# Patient Record
Sex: FEMALE | Race: OTHER | HISPANIC OR LATINO
[De-identification: names, ages, dates, MRNs, and addresses within clinical notes are randomized per-mention and may not be internally consistent; named-entity substitution may affect disease eponyms.]

---

## 2021-08-11 PROBLEM — Z00.00 ENCOUNTER FOR PREVENTIVE HEALTH EXAMINATION: Status: ACTIVE | Noted: 2021-08-11

## 2021-08-16 ENCOUNTER — NON-APPOINTMENT (OUTPATIENT)
Age: 41
End: 2021-08-16

## 2021-08-16 DIAGNOSIS — Z78.9 OTHER SPECIFIED HEALTH STATUS: ICD-10-CM

## 2021-08-20 ENCOUNTER — NON-APPOINTMENT (OUTPATIENT)
Age: 41
End: 2021-08-20

## 2021-08-20 ENCOUNTER — APPOINTMENT (OUTPATIENT)
Dept: GASTROENTEROLOGY | Facility: CLINIC | Age: 41
End: 2021-08-20
Payer: COMMERCIAL

## 2021-08-20 VITALS
BODY MASS INDEX: 20.98 KG/M2 | SYSTOLIC BLOOD PRESSURE: 100 MMHG | HEART RATE: 64 BPM | DIASTOLIC BLOOD PRESSURE: 60 MMHG | HEIGHT: 62 IN | WEIGHT: 114 LBS | OXYGEN SATURATION: 100 %

## 2021-08-20 DIAGNOSIS — Z87.09 PERSONAL HISTORY OF OTHER DISEASES OF THE RESPIRATORY SYSTEM: ICD-10-CM

## 2021-08-20 PROCEDURE — 99205 OFFICE O/P NEW HI 60 MIN: CPT

## 2021-08-20 RX ORDER — PANTOPRAZOLE 40 MG/1
40 TABLET, DELAYED RELEASE ORAL DAILY
Qty: 90 | Refills: 3 | Status: ACTIVE | COMMUNITY
Start: 2021-08-20 | End: 1900-01-01

## 2021-08-20 RX ORDER — CETIRIZINE HCL 10 MG
TABLET ORAL
Refills: 0 | Status: ACTIVE | COMMUNITY

## 2021-08-20 NOTE — ASSESSMENT
[FreeTextEntry1] : \par \par \par 1. GERD:  active w  ht burn,  dysphagia,  globus\par                 r/o complicated GERD w intermittent dysphagia:  erosive dis, stricture/ ring, EE, Barretts\par * H/O  LPR,  No prior h/o Lara’s  or  Esophagitis grade: A--  was found \par \par Recommend: \par * Anti-reflux diet & life-style changes reviewed & re-emphasized.  ++  Bedge required\par * Weight reduction & regular exercise emphasized\par \par *  restart  PPI: Pantop 40mg qam  ,  ++  H2B q HS: Famotidine 40mg \par     No Carafate  1 gram:   --was emphasized\par I reviewed & summarized the prospective randomized & retrospective non-randomized studies\par looking at potential long term SE's of PPIs, w special attention to associations & actual cause\par as related to GI infections, bone loss, cognitive changes, KD, Covid, vitamin & electrolyte deficiencies\par questions were answered, pt advised that PPIs should be used when needed as indicated by their\par clinical indication and response and tapering off is always the goal if possible. pt understood.\par \par *  for  EGD:  [   9    ] mo.  /  [   2021    ] yr \par * No  need for pH Monitor,  Manometry,   \par    may need Esophagram \par *  ++  need for ENT  eval/F/U, or  Surgical  eval  --  was emphasized \par \par \par \par \par \par \par 2. Gastritis :   No N, V, early satiety, ? pain\par *  h/o  H.Pylori,  No  IM,    Bile;    No NSAID  or  ETOH exposure-- was found\par Recommended and reviewed: \par * Avoid NSAIDs / ETOH--have been shown to exacerbate and possible lead to cx's & GIBs\par * No Prevpac  or Pylera, is needed,  s/p ABX regimen in the past, w F/U documenting eradication \par * ++  PPI: pantop 40 mg qd   x 9 0 days, or  Carafate 1 gm QID is needed\par *  for F/U EGD -- for GERD \par \par \par \par \par \par \par 3. Hemorrhoids:  well - controlled.  No pain,  swelling,  itch,  bleeding\par * Discussed the potential complications of thrombosis,  pain,  infection,  swelling, itching,  bleeding \par Recommendations: \par * High - Fiber Diet was reviewed and emphasized\par * 6  --  8 cups of decaffeinated fluid daily was emphasized \par * Sitz Bathes BID,  No:  Anusol HC  Suppos / Cream  NE BID -- was needed\par * No:  Tucks BID,  Balneol Lotion,   Calmoseptine Oint -- was needed ;    can use  Prep H prn\par * No:  need for  Colorectal surgical evaluation for possible ablation w Dr --  was emphasized\par \par \par \par \par \par 4. Colorectal  / Gastric Neoplasia  Screening:  to be evaluated\par   Utilizing teaching posters and anatomical models the following were discussed and emphasized with the patient in detail: \par * Discussed the pre-malignant potential of polyps\par * Discussed the importance of f/u surveillance / screening colonoscopy / EGD \par * High-Fiber Diet was reviewed and emphasized\par * Anti-oxidants and ASA/NSAID Therapy reviewed\par * Given age <  45-51 yo--> colonoscopy in 2 yrs , EGD in 9/2021\par * Recommend Colonoscopy R/O  Colonic  Neoplasia, \par \par \par \par \par \par Informed Consent:\par * The risks & Benefits of EGD  were discussed w patient.\par * This included but was not limited to perforation, bleeding, sedation /med rxns possibly requiring surgery, blood transfusions, antibiotics & CPR/Intubation.\par * Pt. understands & agrees to the procedures.\par The following instructions in regards to the prep and medically essential ( cardiac, pulmonary, sz, psych, endocrine)  pre-op medication administration\par was reviewed and emphasized with the patient . \par * Pt. advised to D/C  ASA/NSAIDs  7   Days  PTP.\par * [ +++ ]  Dulcolax / Miralax / Mag. Citrate ,  [     ] Prepopik/ Clenpiq ,  [     ] Osmo Prep,  [    ] GoLytely,  prep. reviewed w Pt.\par * Hold  [           ] AM of procedure.\par * Hold  [           ] PM of procedure.\par * Take  [           ] PM of procedure.\par * Take  [           ] AM of procedure.\par \par \par \par

## 2021-08-20 NOTE — HISTORY OF PRESENT ILLNESS
[de-identified] : \par \par \par \par This HPI reflects a summary and review of records : including previous and most recent  Labs, body imaging, consults and progress notes, operative and pathology reports, EKG reports, ED records, found in [email or web address], [employer],  [facility] and any additional records brought in by  the patient at the time of the visit.\par \par \par PCP: Carniciu\par \par 40 yo F w h/o Allergeis\par       Gastritis, rx w ABX x 14 days many years, had EGD after which documented eradication\par       Hemorrhoids\par In April 2021--> began w throat tickling/ globus\par In May saw ENt in [place]--> told of GERD w LPR--> Rx w Pantop 40mg qd x 3 weeks w good response\par In June 2021: symptoms returned w globus, throat burn, + ht Burn , chest pressure\par                      +Belching,regurg--> mouth , +  intermittent dysphagia\par took Gaviscon and prilosec prn w some relief\par Also c/o epigastric pain, no NSAIDS, Melena, wt loss, anorezia, early satiety\par \par \par \par * Abd pain-some \par * Nausea-no\par * Vomit-no\par * Early satiety--no\par * Belching-yes \par * Hiccups--no\par * Regurgitation-yes \par * Acid Taste / Water Brash-yes\par * Ht burn-yes\par * Throat Clearing-no\par * Post-Nasal Drip-no\par * Congestion-no\par * Globus-yes\par * Cough-no\par * Wheeze / PC--no\par * Hoarseness-no\par * Dysphagia-intermittent \par * BMs: # 4 qd\par * Constipation-no\par * Diarrhea-no\par * Bloating-no\par * Strain on Defecation--no\par * Incompl Evac--no\par * Flatulence-no\par * Gurgling-no\par * Melena-no\par * BPBPR-no\par * Anorexia-no\par * Wt. Loss-no\par \par

## 2021-09-10 ENCOUNTER — RESULT REVIEW (OUTPATIENT)
Age: 41
End: 2021-09-10

## 2021-09-12 ENCOUNTER — RESULT REVIEW (OUTPATIENT)
Age: 41
End: 2021-09-12

## 2021-09-13 ENCOUNTER — APPOINTMENT (OUTPATIENT)
Dept: GASTROENTEROLOGY | Facility: HOSPITAL | Age: 41
End: 2021-09-13

## 2021-09-13 ENCOUNTER — RESULT REVIEW (OUTPATIENT)
Age: 41
End: 2021-09-13

## 2021-11-05 ENCOUNTER — NON-APPOINTMENT (OUTPATIENT)
Age: 41
End: 2021-11-05

## 2021-11-08 ENCOUNTER — APPOINTMENT (OUTPATIENT)
Dept: GASTROENTEROLOGY | Facility: CLINIC | Age: 41
End: 2021-11-08
Payer: COMMERCIAL

## 2021-11-08 VITALS
HEIGHT: 62 IN | SYSTOLIC BLOOD PRESSURE: 102 MMHG | HEART RATE: 66 BPM | OXYGEN SATURATION: 100 % | BODY MASS INDEX: 20.98 KG/M2 | WEIGHT: 114 LBS | DIASTOLIC BLOOD PRESSURE: 62 MMHG

## 2021-11-08 PROCEDURE — 99214 OFFICE O/P EST MOD 30 MIN: CPT

## 2021-11-08 NOTE — HISTORY OF PRESENT ILLNESS
[de-identified] : \par This HPI reflects a summary and review of records : including previous and most recent  Labs, body imaging, consults and progress notes, operative and pathology reports, EKG reports, ED records, found in [facility], [facility],  [facility] and any additional records brought in by  the patient at the time of the visit.\par \par \par PCP: Ava\par \par 40 yo F w h/o Allergeis\par       Gastritis, rx w ABX x 14 days many years ago , had EGD after which documented eradication\par       Hemorrhoids\par 8/20/21 Init CC: \par In April 2021--> began w throat tickling/ globus\par In May 2021b saw ENT in [place]--> told of GERD w LPR--> Rx w Pantop 40mg qd x 3 weeks w good response\par In June 2021: symptoms returned w globus, throat burn, + ht Burn , chest pressure\par                      +Belching,regurg--> mouth , +  intermittent dysphagia\par took Gaviscon and prilosec prn w some relief\par Also c/o epigastric pain, no NSAIDS, Melena, wt loss, anorezia, early satiety\par 9/13/21 EGD:  gastritis, mild,  no HP; 1+ MUCOUS;  2cm HH, GEJ-  sl agar; Inc Schatzki Ring, Esophagitis A--.No Barretts, no EE\par                           VC: TR bladimir/TR muocus\par \par 11/8/21: Feeling well, no c/o , CP, SOB/ THACKER, Cough, Wheeze, Palpitations, edema\par   \par reviewed endoscopic findings and pathology in detail with patient:  EGD:  913/21\par all of the patients questions were addressed and answered\par \par \par 11/8/21: On Pantop 40 q am and Famotidine 40mg qhs\par                 feeling better, interms of GI issues\par Chest pressure--> no \par * Abd pain-> no\par  Ht burn--> no\par Throat burn--> no\par Globus--> no\par Belch-->no\par Regurg--> no\par * Acid Taste / Water Brash->no\par Dysphagia--> no\par * Nausea-no\par * Vomit-no\par * Early satiety--no\par * Hiccups--no\par  * Throat Clearing-no\par * Post-Nasal Drip-no\par * Congestion-no\par * Cough-no\par * Wheeze / PC--no\par * Hoarseness-no\par  * BMs: # 4 qd\par * Constipation-no\par * Diarrhea-no\par * Bloating-no\par * Strain on Defecation--no\par * Incompl Evac--no\par * Flatulence-no\par * Gurgling-no\par * Melena-no\par * BPBPR-no\par * Anorexia-no\par * Wt. Loss-no\par \par

## 2021-11-08 NOTE — ASSESSMENT
[FreeTextEntry1] : \par \par                          \par \par 1. GERD:  better, w mcu less   ht burn,  dysphagia,  globus\par                9/13/21 EGD:  gastritis, mild,  no HP; 1+ MUCOUS;  2cm HH, GEJ-  sl agar; Inc Schatzki Ring,\par                                        VC: TR bladimir/TR muocus\par    + LPR,  No  Lara’s but +   Esophagitis grade: A-- , w no EE  was found \par \par Recommend: \par * Anti-reflux diet & life-style changes reviewed & re-emphasized.  ++  Bedge required\par * Weight reduction & regular exercise emphasized\par \par *  restart  PPI: Pantop 40mg qam  ,  ++  H2B q HS: Famotidine 40mg \par     No Carafate  1 gram\par I reviewed & summarized the prospective randomized & retrospective non-randomized studies\par looking at potential long term SE's of PPIs, w special attention to associations & actual cause\par as related to GI infections, bone loss, cognitive changes, KD, Covid, vitamin & electrolyte deficiencies\par questions were answered, pt advised that PPIs should be used when needed as indicated by their\par clinical indication and response and tapering off is always the goal if possible. pt understood.\par \par *  for  EGD: 9/2025--for Barretts screening \par * No  need for pH Monitor,  Manometry,   \par    may need Esophagram \par *  ++  need for ENT  eval/F/U, but no Surgical  eval  --  was emphasized \par \par \par \par \par \par \par 2. Gastritis :   No N, V, early satiety, pain \par *  h/o  H.Pylori, but none seen presently, No  IM,  Bile; No NSAID  or  ETOH exposure-- was found\par Recommended and reviewed: \par * Avoid NSAIDs / ETOH--have been shown to exacerbate and possible lead to cx's & GIBs\par * No Prevpac  or Pylera, is needed,  s/p ABX regimen in the past, w F/U documenting eradication \par * ++  PPI: pantop 40 mg qd   x 90 days, No Carafate 1 gm QID is needed\par * np F/U EGD -- needed \par \par \par \par \par \par \par 3. Hemorrhoids:  well - controlled.  No pain,  swelling,  itch,  bleeding\par * Discussed  previously\par   the potential complications of thrombosis,  pain,  infection,  swelling, itching,  bleeding \par Recommendations: \par * High - Fiber Diet was  emphasized\par * 6  --  8 cups of decaffeinated fluid daily was emphasized \par * Sitz Bathes BID prn,    No:  Anusol HC  Suppos / Cream  HI BID -- was needed\par * No:  Tucks BID, Balneol Lotion, Calmoseptine Oint -- was needed ;    can use  Prep H prn\par * No:  need for  Colorectal surgical evaluation for possible ablation \par \par \par \par \par \par 4. Colorectal  Neoplasia  Screening:  \par   Utilizing teaching posters and anatomical models the following were discussed and emphasized with the patient in detail: \par * Discussed the pre-malignant potential of polyps\par * Discussed the importance of f/u surveillance / screening colonoscopy / EGD \par * High-Fiber Diet was reviewed and emphasized\par * Anti-oxidants and ASA/NSAID Therapy reviewed\par * Given age <  45-49 yo--> in 2 yrs  would Recommend Colonoscopy to R/O  Colonic  Neoplasia, \par \par \par \par \par \par \par \par \par \par

## 2021-11-08 NOTE — REVIEW OF SYSTEMS
[Red Eyes] : eyes not red [Skin Lesions] : no skin lesions [Proptosis] : no proptosis [Easy Bruising] : no tendency for easy bruising

## 2022-01-11 NOTE — PHYSICAL EXAM
[General Appearance - Alert] : alert [General Appearance - In No Acute Distress] : in no acute distress [Sclera] : the sclera and conjunctiva were normal [Neck Appearance] : the appearance of the neck was normal [] : no respiratory distress [Auscultation Breath Sounds / Voice Sounds] : lungs were clear to auscultation bilaterally [Heart Rate And Rhythm] : heart rate was normal and rhythm regular [Heart Sounds] : normal S1 and S2 [Heart Sounds Gallop] : no gallops [Murmurs] : no murmurs [Heart Sounds Pericardial Friction Rub] : no pericardial rub [Edema] : there was no peripheral edema [Abnormal Walk] : normal gait [Skin Color & Pigmentation] : normal skin color and pigmentation [Oriented To Time, Place, And Person] : oriented to person, place, and time [Flat] : flat [Normal] : normal [Soft, Nontender] : the abdomen was soft and nontender [No Mass] : no masses were palpated [No HSM] : no hepatosplenomegaly noted

## 2022-01-18 ENCOUNTER — APPOINTMENT (OUTPATIENT)
Dept: GASTROENTEROLOGY | Facility: CLINIC | Age: 42
End: 2022-01-18
Payer: COMMERCIAL

## 2022-01-18 VITALS
DIASTOLIC BLOOD PRESSURE: 64 MMHG | HEIGHT: 62 IN | HEART RATE: 76 BPM | OXYGEN SATURATION: 100 % | WEIGHT: 116 LBS | RESPIRATION RATE: 16 BRPM | BODY MASS INDEX: 21.35 KG/M2 | TEMPERATURE: 97.4 F | SYSTOLIC BLOOD PRESSURE: 92 MMHG

## 2022-01-18 PROCEDURE — 99214 OFFICE O/P EST MOD 30 MIN: CPT

## 2022-01-18 NOTE — REVIEW OF SYSTEMS
[As Noted in HPI] : as noted in HPI [Negative] : Respiratory [Red Eyes] : eyes not red [Skin Lesions] : no skin lesions [Proptosis] : no proptosis [Easy Bruising] : no tendency for easy bruising

## 2022-01-18 NOTE — ASSESSMENT
[FreeTextEntry1] : \par \par                          \par \par 1. GERD:  mild flare since December 2/2 noncompliance w diet\par                 now w intermittnet CP /  ht burn,  No sphagia,  globus\par                 ? secondary esophageal spasm/ SS esophagus\par                9/13/21 EGD:  gastritis, mild,  no HP; 1+ MUCOUS;  2cm HH, GEJ-  sl agar; Inc Schatzki Ring,\par                                        VC: TR bladimir/TR muocus\par    + LPR,  No  Lara’s but +   Esophagitis grade: A-- , w no EE  was found \par \par Recommend: \par * Anti-reflux diet & life-style changes reviewed & re-emphasized.  ++  Bedge required\par * Weight reduction & regular exercise emphasized\par \par *  restart  PPI: Pantop 40mg qam  ,  ++  H2B q HS: Famotidine 40mg \par    If episodes persist can split Pant 40--> 20 mg bid 1/2hr ac\par    other wise Gaviscon 30cc prm\par    Also can start a trial  Levsin SL prn\par     No Carafate  1 gram\par I reviewed & summarized the prospective randomized & retrospective non-randomized studies\par looking at potential long term SE's of PPIs, w special attention to associations & actual cause\par as related to GI infections, bone loss, cognitive changes, KD, Covid, vitamin & electrolyte deficiencies\par questions were answered, pt advised that PPIs should be used when needed as indicated by their\par clinical indication and response and tapering off is always the goal if possible. pt understood.\par \par *  for  EGD: 9/2025--for Barretts screening \par * No  need for pH Monitor,  Manometry,   \par    may need Esophagram \par *  ++  need for ENT  eval/F/U, but no Surgical  eval  --  was emphasized \par \par \par \par \par \par \par 2. Gastritis :   No N, V, early satiety, pain \par *  h/o  H.Pylori, but none seen presently, No  IM,  Bile; No NSAID  or  ETOH exposure-- was found\par Recommended and reviewed: \par * Avoid NSAIDs / ETOH--have been shown to exacerbate and possible lead to cx's & GIBs\par * No Prevpac  or Pylera, is needed,  s/p ABX regimen in the past, w F/U documenting eradication \par * ++  PPI: pantop 40 mg qd   x 90 days, No Carafate 1 gm QID is needed\par * no F/U EGD -- needed \par \par \par \par \par \par \par 3. Hemorrhoids:  well - controlled.  No pain,  swelling,  itch,  bleeding\par * Discussed  previously\par   the potential complications of thrombosis,  pain,  infection,  swelling, itching,  bleeding \par Recommendations: \par * High - Fiber Diet was  emphasized\par * 6  --  8 cups of decaffeinated fluid daily was emphasized \par * Sitz Bathes BID prn,    No:  Anusol HC  Suppos / Cream  TX BID -- was needed\par * No:  Tucks BID, Balneol Lotion, Calmoseptine Oint -- was needed ;    can use  Prep H prn\par * No:  need for  Colorectal surgical evaluation for possible ablation \par \par \par \par \par \par 4. Colorectal  Neoplasia  Screening:  \par   Utilizing teaching posters and anatomical models the following were discussed and emphasized with the patient in detail: \par * Discussed the pre-malignant potential of polyps\par * Discussed the importance of f/u surveillance / screening colonoscopy / EGD \par * High-Fiber Diet was reviewed and emphasized\par * Anti-oxidants and ASA/NSAID Therapy reviewed\par * Given age <  45-49 yo--> in 2 yrs  would Recommend Colonoscopy to R/O  Colonic  Neoplasia, \par \par \par \par \par \par \par \par \par \par

## 2022-01-18 NOTE — HISTORY OF PRESENT ILLNESS
[de-identified] : \par This HPI reflects a summary and review of records : including previous and most recent  Labs, body imaging, consults and progress notes, operative and pathology reports, EKG reports, ED records, found in [email or web address], [employer],  [employer] and any additional records brought in by  the patient at the time of the visit.\par \par \par PCP: Carniciu\par \par 42 yo F w h/o Allergeis\par       Gastritis, rx w ABX x 14 days many years ago , had EGD after which documented eradication\par       Hemorrhoids\par 8/20/21 Init CC: \par In April 2021--> began w throat tickling/ globus\par In May 2021b saw ENT in [place]--> told of GERD w LPR--> Rx w Pantop 40mg qd x 3 weeks w good response\par In June 2021: symptoms returned w globus, throat burn, + ht Burn , chest pressure\par                      +Belching,regurg--> mouth , +  intermittent dysphagia\par took Gaviscon and prilosec prn w some relief\par Also c/o epigastric pain, no NSAIDS, Melena, wt loss, anorezia, early satiety\par 9/13/21 EGD:  gastritis, mild,  no HP; 1+ MUCOUS;  2cm HH, GEJ-  sl rich; Inc Schatzki Ring, Esophagitis A--.No Barretts, no EE\par                           VC: TR bladimir/TR muocus\par \par 11/8/21: No  globus, throat burn,ht Burn hest pressure\par               No  Belching,regurg--> mouth , intermittent dysphagia\par \par 1/18/22 Feeling well, no c/o , CP, SOB/ THACKER, Cough, Wheeze, Palpitations, edema\par   \par reviewed endoscopic findings and pathology in detail with patient:  EGD:  913/21\par all of the patients questions were addressed and answered\par \par \par 1/18/22  On Pantop 40 q am and Famotidine 40mg qhs\par                 In December eating spicy foods, caffeine\par                 had epigastric burn--> chest\par                 no throat burn/ globus\par                 since then episodes of CP, no sob, cough, wheeze\par                 usu 30-60min PC, takes tums w relief\par Chest pressure--> intermittnet \par * Abd pain-> no\par  Ht burn--> occas \par Throat burn--> no\par Globus--> no\par Belch-->yes\par Regurg--> no\par * Acid Taste / Water Brash->no\par Dysphagia--> no\par * Nausea-no\par * Vomit-no\par * Early satiety--no\par * Hiccups--no\par  * Throat Clearing-no\par * Post-Nasal Drip-no\par * Congestion-no\par * Cough-no\par * Wheeze / PC--no\par * Hoarseness-no\par  * BMs: # 4 qd\par * Constipation-no\par * Diarrhea-no\par * Bloating-no\par * Strain on Defecation--no\par * Incompl Evac--no\par * Flatulence-no\par * Gurgling-no\par * Melena-no\par * BPBPR-no\par * Anorexia-no\par * Wt. Loss-no\par \par

## 2022-05-10 ENCOUNTER — NON-APPOINTMENT (OUTPATIENT)
Age: 42
End: 2022-05-10

## 2022-05-24 ENCOUNTER — APPOINTMENT (OUTPATIENT)
Dept: GASTROENTEROLOGY | Facility: CLINIC | Age: 42
End: 2022-05-24
Payer: COMMERCIAL

## 2022-05-24 VITALS
WEIGHT: 115 LBS | HEART RATE: 72 BPM | DIASTOLIC BLOOD PRESSURE: 60 MMHG | SYSTOLIC BLOOD PRESSURE: 104 MMHG | BODY MASS INDEX: 21.16 KG/M2 | HEIGHT: 62 IN

## 2022-05-24 DIAGNOSIS — Z86.19 PERSONAL HISTORY OF OTHER INFECTIOUS AND PARASITIC DISEASES: ICD-10-CM

## 2022-05-24 DIAGNOSIS — R13.19 OTHER DYSPHAGIA: ICD-10-CM

## 2022-05-24 DIAGNOSIS — K21.9 GASTRO-ESOPHAGEAL REFLUX DISEASE W/OUT ESOPHAGITIS: ICD-10-CM

## 2022-05-24 DIAGNOSIS — K64.8 OTHER HEMORRHOIDS: ICD-10-CM

## 2022-05-24 DIAGNOSIS — Z12.11 ENCOUNTER FOR SCREENING FOR MALIGNANT NEOPLASM OF COLON: ICD-10-CM

## 2022-05-24 DIAGNOSIS — K29.50 UNSPECIFIED CHRONIC GASTRITIS W/OUT BLEEDING: ICD-10-CM

## 2022-05-24 PROCEDURE — 99214 OFFICE O/P EST MOD 30 MIN: CPT

## 2022-05-24 NOTE — ASSESSMENT
[FreeTextEntry1] : \par \par                          \par \par 1. GERD:  s/p mild flare  December 2/2 noncompliance w diet\par                 Now w no  intermittnet CP,  ht burn,  Dysphagia,  globus\par                 ? secondary esophageal spasm/ SS esophagus\par                9/13/21 EGD:  gastritis, mild,  no HP; 1+ MUCOUS;  2cm HH, GEJ-  sl agar; Inc Schatzki Ring,\par                                        VC: TR bladimir/TR muocus\par    + LPR,  No  Lara’s but +   Esophagitis grade: A-- , w no EE  was found \par \par Recommend: \par * Anti-reflux diet & life-style changes reviewed & re-emphasized.  ++  Bedge required\par * Weight reduction & regular exercise emphasized\par \par *  on  PPI: Pantop 40mg qam --> qo am  Alt w Famotidine 40mg qo am  ,  ++  H2B q HS: Famotidine 40mg \par    If episodes persist can split Pant 40--> 20 mg bid 1/2hr ac\par    other wise Gaviscon 30cc prm\par    Also can start a trial  Levsin SL prn\par     No Carafate  1 gram\par I reviewed & summarized the prospective randomized & retrospective non-randomized studies\par looking at potential long term SE's of PPIs, w special attention to associations & actual cause\par as related to GI infections, bone loss, cognitive changes, KD, Covid, vitamin & electrolyte deficiencies\par questions were answered, pt advised that PPIs should be used when needed as indicated by their\par clinical indication and response and tapering off is always the goal if possible. pt understood.\par \par *  for  EGD: 9/2025--for Barretts screening \par * No  need for pH Monitor,  Manometry,   \par    may need Esophagram \par *  ++  need for ENT  eval/F/U, but no Surgical  eval  --  was emphasized \par \par \par \par \par \par \par 2. Gastritis :   No N, V, early satiety, pain \par *  h/o  H.Pylori, but none seen presently, No  IM,  Bile; No NSAID  or  ETOH exposure-- was found\par Recommended and reviewed: \par * Avoid NSAIDs / ETOH--have been shown to exacerbate and possible lead to cx's & GIBs\par * No Prevpac  or Pylera, is needed,  s/p ABX regimen in the past, w F/U documenting eradication \par * ++  PPI: pantop 40 mg qo am   x 90 days, No Carafate 1 gm QID is needed\par * no F/U EGD -- needed \par \par \par \par \par \par \par 3. Hemorrhoids:  well - controlled.  No pain,  swelling,  itch,  bleeding\par * Discussed  previously\par   the potential complications of thrombosis,  pain,  infection,  swelling, itching,  bleeding \par Recommendations: \par * High - Fiber Diet was  emphasized\par * 6  --  8 cups of decaffeinated fluid daily was emphasized \par * Sitz Bathes BID prn,    No:  Anusol HC  Suppos / Cream  AL BID -- was needed\par * No:  Tucks BID, Balneol Lotion, Calmoseptine Oint -- was needed ;    can use  Prep H prn\par * No:  need for  Colorectal surgical evaluation for possible ablation \par \par \par \par \par \par 4. Colorectal  Neoplasia  Screening:  \par   Utilizing teaching posters and anatomical models the following were discussed and emphasized with the patient in detail: \par * Discussed the pre-malignant potential of polyps\par * Discussed the importance of f/u surveillance / screening colonoscopy / EGD \par * High-Fiber Diet was reviewed and emphasized\par * Anti-oxidants and ASA/NSAID Therapy reviewed\par * Given age <  45-51 yo--> in 1 yrs  would Recommend Colonoscopy to R/O  Colonic  Neoplasia, \par \par \par \par \par \par \par \par \par \par

## 2022-05-24 NOTE — HISTORY OF PRESENT ILLNESS
[de-identified] : \par This HPI reflects a summary and review of records : including previous and most recent  Labs, body imaging, consults and progress notes, operative and pathology reports, EKG reports, ED records, found in [facility], [facility],  [facility] and any additional records brought in by  the patient at the time of the visit.\par \par \par PCP: Ava\par \par 41 yo F w h/o Allergeis\par       HP Gastritis, rx w ABX x 14 days many years ago , had EGD after which documented eradication\par       Hemorrhoids\par 8/20/21 Init CC: \par In April 2021--> began w throat tickling/ globus\par In May 2021 saw ENT in [place]--> told of GERD w LPR--> Rx w Pantop 40mg qd x 3 weeks w good response\par In June 2021: symptoms returned w globus, throat burn, + ht Burn , chest pressure\par                      +Belching,regurg--> mouth , +  intermittent dysphagia\par took Gaviscon and prilosec prn w some relief\par Also c/o epigastric pain, no NSAIDS, Melena, wt loss, anorezia, early satiety\par 9/13/21 EGD:  gastritis, mild,  no HP; 1+ MUCOUS;  2cm HH, GEJ-  sl rich; Inc Schatzki Ring, Esophagitis A--.No Barretts, no EE\par                           VC: TR bladimir/TR muocus\par \par 11/8/21: No  globus, throat burn,ht Burn chest pressure\par               No  Belching,regurg--> mouth , intermittent dysphagia\par \par 1/18/22   On Pantop 40 q am and Famotidine 40mg qhs\par                 In December eating spicy foods, caffeine\par                 had epigastric burn--> chest; no throat burn/ globus\par                 since then episodes of CP, no sob, cough, wheeze\par                 usu 30-60min PC, takes tums w relief\par \par \par 5/24/22 : Feeling well, no c/o SOB/ THACKER, Cough, Wheeze, Palpitations, edema\par   \par reviewed endoscopic findings and pathology in detail with patient:  EGD:  913/21\par all of the patients questions were addressed and answered\par \par \par 5/24/22   On Pantop 40 q am and Famotidine 40mg qhs, doing well \par                 \par  epigastric burn--> chest: none \par                               \par Chest pressure--> intermittent:  no\par * Abd pain-> Epigastric --> no\par  Ht burn--> no\par Throat burn--> no\par Globus--> no\par Belch-->no\par Regurg--> no\par * Acid Taste / Water Brash->no\par Dysphagia--> no\par * Nausea-no\par * Vomit-no\par * Early satiety--no\par * Hiccups--no\par  * Throat Clearing-no\par * Post-Nasal Drip-no\par * Congestion-no\par * Cough-no\par * Wheeze / PC--no\par * Hoarseness-no\par  * BMs: # 4 qd\par * Constipation-no\par * Diarrhea-no\par * Bloating-no\par * Strain on Defecation--no\par * Incompl Evac--no\par * Flatulence-no\par * Gurgling-no\par * Melena-no\par * BPBPR-no\par * Anorexia-no\par * Wt. Loss-no\par \par

## 2022-06-24 ENCOUNTER — NON-APPOINTMENT (OUTPATIENT)
Age: 42
End: 2022-06-24

## 2022-09-14 ENCOUNTER — RX RENEWAL (OUTPATIENT)
Age: 42
End: 2022-09-14

## 2022-09-14 RX ORDER — FAMOTIDINE 40 MG/1
40 TABLET, FILM COATED ORAL TWICE DAILY
Qty: 90 | Refills: 3 | Status: ACTIVE | COMMUNITY
Start: 2021-09-13 | End: 1900-01-01

## 2023-01-02 ENCOUNTER — NON-APPOINTMENT (OUTPATIENT)
Age: 43
End: 2023-01-02

## 2023-01-02 NOTE — PHYSICAL EXAM
[General Appearance - Alert] : alert [General Appearance - In No Acute Distress] : in no acute distress [Sclera] : the sclera and conjunctiva were normal [] : no respiratory distress [Auscultation Breath Sounds / Voice Sounds] : lungs were clear to auscultation bilaterally [Heart Rate And Rhythm] : heart rate was normal and rhythm regular [Heart Sounds] : normal S1 and S2 [Murmurs] : no murmurs [Heart Sounds Gallop] : no gallops [Heart Sounds Pericardial Friction Rub] : no pericardial rub [Edema] : there was no peripheral edema [Abnormal Walk] : normal gait [Skin Color & Pigmentation] : normal skin color and pigmentation [Oriented To Time, Place, And Person] : oriented to person, place, and time [Flat] : flat [Normal] : normal [Soft, Nontender] : the abdomen was soft and nontender [No Mass] : no masses were palpated [No HSM] : no hepatosplenomegaly noted

## 2023-01-03 NOTE — HISTORY OF PRESENT ILLNESS
[de-identified] : \par This HPI reflects a summary and review of records : including previous and most recent  Labs, body imaging, consults and progress notes, operative and pathology reports, EKG reports, ED records, found in [facility], [facility],  [facility] and any additional records brought in by  the patient at the time of the visit.\par \par \par PCP: Carniciu\par \par 41 yo F w h/o Allergeis\par       HP Gastritis, rx w ABX x 14 days many years ago , had EGD after which documented eradication\par       Hemorrhoids\par 8/20/21 Init CC: \par In April 2021--> began w throat tickling/ globus\par In May 2021 saw ENT in [place]--> told of GERD w LPR--> Rx w Pantop 40mg qd x 3 weeks w good response\par In June 2021: symptoms returned w globus, throat burn, + ht Burn , chest pressure\par                      +Belching,regurg--> mouth , +  intermittent dysphagia\par took Gaviscon and prilosec prn w some relief\par Also c/o epigastric pain, no NSAIDS, Melena, wt loss, anorezia, early satiety\par 9/13/21 EGD:  gastritis, mild,  no HP; 1+ MUCOUS;  2cm HH, GEJ-  sl rich; Inc Schatzki Ring, Esophagitis A--.No Barretts, no EE\par                           VC: TR bladimir/TR muocus\par \par 11/8/21: No  globus, throat burn,ht Burn chest pressure\par               No  Belching,regurg--> mouth , intermittent dysphagia\par \par 1/18/22   On Pantop 40 q am and Famotidine 40mg qhs\par                 In December eating spicy foods, caffeine\par                 had epigastric burn--> chest; no throat burn/ globus\par                 since then episodes of CP, no sob, cough, wheeze\par                 usu 30-60min PC, takes tums w relief\par \par \par 5/24/22 : On Pantop 40 q am and Famotidine 40mg qhs, doing well \par                Rec taper: Pantop 40mg alt w Famotidine 40mg q am & Famotidine 40mg q hs\par                Levsin SL Prn \par \par 1/24/23 : Feeling well, no c/o SOB/ THACKER, Cough, Wheeze, Palpitations, edema\par   \par reviewed endoscopic findings and pathology in detail with patient:  EGD:  913/21\par all of the patients questions were addressed and answered\par \par \par 1/24/23: \par                 \par  epigastric burn--> chest: none \par                               \par Chest pressure--> intermittent:  no\par * Abd pain-> Epigastric --> no\par  Ht burn--> no\par Throat burn--> no\par Globus--> no\par Belch-->no\par Regurg--> no\par * Acid Taste / Water Brash->no\par Dysphagia--> no\par * Nausea-no\par * Vomit-no\par * Early satiety--no\par * Hiccups--no\par  * Throat Clearing-no\par * Post-Nasal Drip-no\par * Congestion-no\par * Cough-no\par * Wheeze / PC--no\par * Hoarseness-no\par  * BMs: # 4 qd\par * Constipation-no\par * Diarrhea-no\par * Bloating-no\par * Strain on Defecation--no\par * Incompl Evac--no\par * Flatulence-no\par * Gurgling-no\par * Melena-no\par * BPBPR-no\par * Anorexia-no\par * Wt. Loss-no\par \par

## 2023-01-03 NOTE — ASSESSMENT
[FreeTextEntry1] : \par \par                          \par \par 1. GERD:  s/p mild flare  December 2/2 noncompliance w diet\par                   presently w no  intermittnet CP,  ht burn,  Dysphagia,  globus\par                 ? secondary esophageal spasm/ SS esophagus\par                9/13/21 EGD:  gastritis, mild,  no HP; 1+ MUCOUS;  2cm HH, GEJ-  sl agar; Inc Schatzki Ring,\par                                        VC: TR bladimir/TR muocus\par    + LPR,  No  Lara’s but +   Esophagitis grade: A-- , w no EE  was found \par \par Recommend: \par * Anti-reflux diet & life-style changes reviewed & re-emphasized.  ++  Bedge required\par * Weight reduction & regular exercise emphasized\par \par *  on  PPI: Pantop 40mg qam -->  Alt w Famotidine 40mg qo am  ,  ++  H2B q HS: Famotidine 40mg \par    If episodes persist can split Pant 40--> 20 mg bid 1/2hr ac\par    other wise Gaviscon 30cc prm\par    Also can start a trial  Levsin SL prn\par     No Carafate  1 gram\par I  previously  reviewed & summarized the prospective randomized & retrospective non-randomized studies\par looking at potential long term SE's of PPIs, w special attention to associations & actual cause\par as related to GI infections, bone loss, cognitive changes, KD, Covid, vitamin & electrolyte deficiencies\par questions were answered, pt advised that PPIs should be used when needed as indicated by their\par clinical indication and response and tapering off is always the goal if possible. pt understood.\par \par *  for  EGD: 9/2025--for Barretts screening \par * No  need for pH Monitor,  Manometry,   \par    may need Esophagram \par *  ++  need for ENT  eval/F/U, but no Surgical  eval  --  was emphasized \par \par \par \par \par \par \par 2. Gastritis :   No N, V, early satiety, pain \par *  h/o  H.Pylori, but none seen presently, No  IM,  Bile; No NSAID  or  ETOH exposure-- was found\par Recommended and reviewed: \par * Avoid NSAIDs / ETOH--have been shown to exacerbate and possible lead to cx's & GIBs\par * No Prevpac  or Pylera, is needed,  s/p ABX regimen in the past, w F/U documenting eradication \par * ++  PPI: pantop 40 mg qo am   x 90 days, No Carafate 1 gm QID is needed\par * no F/U EGD -- needed \par \par \par \par \par \par \par 3. Hemorrhoids:  well - controlled.  No pain,  swelling,  itch,  bleeding\par * Discussed  previously\par   the potential complications of thrombosis,  pain,  infection,  swelling, itching,  bleeding \par Recommendations: \par * High - Fiber Diet was  emphasized\par * 6  --  8 cups of decaffeinated fluid daily was emphasized \par * Sitz Bathes BID prn,    No:  Anusol HC  Suppos / Cream  LA BID -- was needed\par * No:  Tucks BID, Balneol Lotion, Calmoseptine Oint -- was needed ;    can use  Prep H prn\par * No:  need for  Colorectal surgical evaluation for possible ablation \par \par \par \par \par \par 4. Colorectal  Neoplasia  Screening:  \par   Utilizing teaching posters and anatomical models the following were discussed and emphasized with the patient in detail: \par * Discussed the pre-malignant potential of polyps\par * Discussed the importance of f/u surveillance / screening colonoscopy / EGD \par * High-Fiber Diet was reviewed and emphasized\par * Anti-oxidants and ASA/NSAID Therapy reviewed\par * Given age <  46 yo--> in 1 mo would Recommend Colonoscopy to R/O  Colonic  Neoplasia, \par \par \par \par \par \par \par \par \par \par

## 2023-01-03 NOTE — REVIEW OF SYSTEMS
[As Noted in HPI] : as noted in HPI [Negative] : Respiratory [Skin Lesions] : no skin lesions [Confused] : no confusion [Easy Bruising] : no tendency for easy bruising

## 2023-01-24 ENCOUNTER — APPOINTMENT (OUTPATIENT)
Dept: GASTROENTEROLOGY | Facility: CLINIC | Age: 43
End: 2023-01-24

## 2024-02-01 ENCOUNTER — NON-APPOINTMENT (OUTPATIENT)
Age: 44
End: 2024-02-01